# Patient Record
Sex: FEMALE | Race: WHITE | NOT HISPANIC OR LATINO | Employment: UNEMPLOYED | URBAN - METROPOLITAN AREA
[De-identification: names, ages, dates, MRNs, and addresses within clinical notes are randomized per-mention and may not be internally consistent; named-entity substitution may affect disease eponyms.]

---

## 2023-03-20 PROBLEM — M25.511 RIGHT SHOULDER PAIN: Status: ACTIVE | Noted: 2023-03-20

## 2023-03-20 PROBLEM — M81.0 AGE RELATED OSTEOPOROSIS: Status: ACTIVE | Noted: 2023-03-20

## 2023-03-20 PROBLEM — M25.611 STIFFNESS OF RIGHT SHOULDER, NOT ELSEWHERE CLASSIFIED: Status: ACTIVE | Noted: 2023-03-20

## 2023-03-20 PROBLEM — R29.3 POSTURE IMBALANCE: Status: ACTIVE | Noted: 2023-03-20

## 2023-03-20 RX ORDER — NAPROXEN 500 MG/1
1 TABLET ORAL
COMMUNITY
Start: 2022-10-31 | End: 2023-03-22

## 2023-03-22 ENCOUNTER — OFFICE VISIT (OUTPATIENT)
Dept: PRIMARY CARE | Facility: CLINIC | Age: 58
End: 2023-03-22
Payer: COMMERCIAL

## 2023-03-22 VITALS
WEIGHT: 128.6 LBS | DIASTOLIC BLOOD PRESSURE: 76 MMHG | SYSTOLIC BLOOD PRESSURE: 106 MMHG | HEART RATE: 80 BPM | BODY MASS INDEX: 20.67 KG/M2 | OXYGEN SATURATION: 99 % | RESPIRATION RATE: 18 BRPM | HEIGHT: 66 IN

## 2023-03-22 DIAGNOSIS — R10.10 PAIN OF UPPER ABDOMEN: Primary | ICD-10-CM

## 2023-03-22 PROCEDURE — 1036F TOBACCO NON-USER: CPT | Performed by: NURSE PRACTITIONER

## 2023-03-22 PROCEDURE — 99213 OFFICE O/P EST LOW 20 MIN: CPT | Performed by: NURSE PRACTITIONER

## 2023-03-22 RX ORDER — CALCIUM CARBONATE 500(1250)
1 TABLET ORAL DAILY
COMMUNITY
Start: 2010-07-26

## 2023-03-22 RX ORDER — MULTIVITAMIN
1 TABLET ORAL DAILY
COMMUNITY

## 2023-03-22 ASSESSMENT — ENCOUNTER SYMPTOMS
NAUSEA: 1
BLOATING: 0
CONSTIPATION: 0
VOMITING: 0
CHILLS: 0
ANOREXIA: 0
ABDOMINAL PAIN: 1

## 2023-03-22 NOTE — ASSESSMENT & PLAN NOTE
Negative exam today  Will try famotidine if happens again  Red flags discussed   Discussed will do further workup if it returns

## 2023-03-22 NOTE — PROGRESS NOTES
"Subjective   Patient ID: Dejah Ambrose is a 57 y.o. female who presents for GI Problem (Patient in today to discuss concerns of right sided abdomen and LBP for the past few months. [id number]).    Bms are regular - daily  Has not had symptoms since 3/17  Describes as aching and did have nausea - did resolve when episode stopped - lasted about an hour    Abdominal Pain  This is a new problem. The current episode started more than 1 month ago. The onset quality is undetermined. The problem occurs intermittently. The problem has been unchanged. The pain is located in the epigastric region. The pain is at a severity of 6/10. The pain is mild. Quality: aching discomfort. The abdominal pain radiates to the periumbilical region. Associated symptoms include nausea. Pertinent negatives include no anorexia, constipation or vomiting. Associated symptoms comments: Negative constipation or diarrhea  . Nothing aggravates the pain. There is no history of PUD.        Review of Systems   Constitutional:  Negative for chills.   Gastrointestinal:  Positive for abdominal pain and nausea. Negative for anorexia, bloating, constipation, dysphagia and vomiting.   All other systems reviewed and are negative.      Objective   /76   Pulse 80   Resp 18   Ht 1.676 m (5' 6\")   Wt 58.3 kg (128 lb 9.6 oz)   SpO2 99%   BMI 20.76 kg/m²     Physical Exam  Vitals and nursing note reviewed.   Constitutional:       Appearance: Normal appearance. She is normal weight.   HENT:      Head: Normocephalic and atraumatic.      Mouth/Throat:      Mouth: Mucous membranes are moist.   Eyes:      Extraocular Movements: Extraocular movements intact.      Conjunctiva/sclera: Conjunctivae normal.      Pupils: Pupils are equal, round, and reactive to light.   Cardiovascular:      Rate and Rhythm: Normal rate and regular rhythm.      Pulses: Normal pulses.      Heart sounds: Normal heart sounds.   Pulmonary:      Effort: Pulmonary effort is normal.      Breath " sounds: Normal breath sounds.   Abdominal:      General: Abdomen is flat. Bowel sounds are normal. There is no distension.      Palpations: Abdomen is soft. There is no mass.      Tenderness: There is no abdominal tenderness. There is no guarding or rebound.      Hernia: No hernia is present.   Skin:     General: Skin is warm.      Capillary Refill: Capillary refill takes less than 2 seconds.   Neurological:      General: No focal deficit present.      Mental Status: She is alert and oriented to person, place, and time.   Psychiatric:         Mood and Affect: Mood normal.         Behavior: Behavior normal.         Thought Content: Thought content normal.         Judgment: Judgment normal.         Assessment/Plan   Problem List Items Addressed This Visit          Nervous    Pain of upper abdomen - Primary     Negative exam today  Will try famotidine if happens again  Red flags discussed   Discussed will do further workup if it returns

## 2023-03-22 NOTE — PATIENT INSTRUCTIONS
If you have the symptoms again - try over the counter famotidine  If the symptoms become more frequent - please let me know and we will do a further workup

## 2023-03-30 DIAGNOSIS — R10.10 PAIN OF UPPER ABDOMEN: Primary | ICD-10-CM

## 2023-03-30 NOTE — PROGRESS NOTES
Left side abdominal pain and umbilical pain returning.  Requesting further work up.  Famotidine did not help.  Cbc,cmp, CT abd ordered

## 2023-03-31 ENCOUNTER — LAB (OUTPATIENT)
Dept: LAB | Facility: LAB | Age: 58
End: 2023-03-31
Payer: COMMERCIAL

## 2023-03-31 DIAGNOSIS — R10.10 PAIN OF UPPER ABDOMEN: ICD-10-CM

## 2023-03-31 LAB
ALANINE AMINOTRANSFERASE (SGPT) (U/L) IN SER/PLAS: 20 U/L (ref 7–45)
ALBUMIN (G/DL) IN SER/PLAS: 4.4 G/DL (ref 3.4–5)
ALKALINE PHOSPHATASE (U/L) IN SER/PLAS: 74 U/L (ref 33–110)
ANION GAP IN SER/PLAS: 14 MMOL/L (ref 10–20)
ASPARTATE AMINOTRANSFERASE (SGOT) (U/L) IN SER/PLAS: 22 U/L (ref 9–39)
BILIRUBIN TOTAL (MG/DL) IN SER/PLAS: 0.8 MG/DL (ref 0–1.2)
CALCIUM (MG/DL) IN SER/PLAS: 9.3 MG/DL (ref 8.6–10.3)
CARBON DIOXIDE, TOTAL (MMOL/L) IN SER/PLAS: 29 MMOL/L (ref 21–32)
CHLORIDE (MMOL/L) IN SER/PLAS: 101 MMOL/L (ref 98–107)
CREATININE (MG/DL) IN SER/PLAS: 0.71 MG/DL (ref 0.5–1.05)
ERYTHROCYTE DISTRIBUTION WIDTH (RATIO) BY AUTOMATED COUNT: 12.6 % (ref 11.5–14.5)
ERYTHROCYTE MEAN CORPUSCULAR HEMOGLOBIN CONCENTRATION (G/DL) BY AUTOMATED: 34.1 G/DL (ref 32–36)
ERYTHROCYTE MEAN CORPUSCULAR VOLUME (FL) BY AUTOMATED COUNT: 95 FL (ref 80–100)
ERYTHROCYTES (10*6/UL) IN BLOOD BY AUTOMATED COUNT: 4.36 X10E12/L (ref 4–5.2)
GFR FEMALE: >90 ML/MIN/1.73M2
GLUCOSE (MG/DL) IN SER/PLAS: 95 MG/DL (ref 74–99)
HEMATOCRIT (%) IN BLOOD BY AUTOMATED COUNT: 41.4 % (ref 36–46)
HEMOGLOBIN (G/DL) IN BLOOD: 14.1 G/DL (ref 12–16)
LEUKOCYTES (10*3/UL) IN BLOOD BY AUTOMATED COUNT: 4.4 X10E9/L (ref 4.4–11.3)
PLATELETS (10*3/UL) IN BLOOD AUTOMATED COUNT: 226 X10E9/L (ref 150–450)
POTASSIUM (MMOL/L) IN SER/PLAS: 4.6 MMOL/L (ref 3.5–5.3)
PROTEIN TOTAL: 6.8 G/DL (ref 6.4–8.2)
SODIUM (MMOL/L) IN SER/PLAS: 139 MMOL/L (ref 136–145)
UREA NITROGEN (MG/DL) IN SER/PLAS: 13 MG/DL (ref 6–23)

## 2023-03-31 PROCEDURE — 36415 COLL VENOUS BLD VENIPUNCTURE: CPT

## 2023-03-31 PROCEDURE — 80053 COMPREHEN METABOLIC PANEL: CPT

## 2023-03-31 PROCEDURE — 85027 COMPLETE CBC AUTOMATED: CPT

## 2023-12-08 ENCOUNTER — ALLIED HEALTH (OUTPATIENT)
Dept: INTEGRATIVE MEDICINE | Facility: CLINIC | Age: 58
End: 2023-12-08

## 2023-12-08 DIAGNOSIS — M54.31 RIGHT SIDED SCIATICA: Primary | ICD-10-CM

## 2023-12-08 PROCEDURE — 97810 ACUP 1/> WO ESTIM 1ST 15 MIN: CPT | Performed by: NATUROPATH

## 2023-12-08 PROCEDURE — 97811 ACUP 1/> W/O ESTIM EA ADD 15: CPT | Performed by: NATUROPATH

## 2023-12-08 NOTE — PROGRESS NOTES
Acupuncture Visit:     Subjective   Patient ID: Dejah Ambrose is a 58 y.o. female who presents for No chief complaint on file.  Had covid in October  Bad cough that lingered and caused back and right side sciatic pain  Has finally largely cleared  Pain depends on how  she moves or sits only right sided  was shooting down anterior lege to knee.   No longer having shooting pain    No pain on left side    Pain 1/10 today, can be up to 5/10    No lingering respiratory sx.     Still watching hair growth    Sleep- better recnely  Energy- still building, but getting better                  Review of Systems         Provider reviewed plan for the acupuncture session, precautions and contraindications. Patient/guardian/hospital staff has given consent to treat with full understanding of what to expect during the session. Before acupuncture began, provider explained to the patient to communicate at any time if the procedure was causing discomfort past their tolerance level. Patient agreed to advise acupuncturist. The acupuncturist counseled the patient on the risks of acupuncture treatment including pain, infection, bleeding, and no relief of pain. The patient was positioned comfortably. There was no evidence of infection at the site of needle insertions.    Objective   Physical Exam                             Assessment/Plan

## 2023-12-19 ENCOUNTER — APPOINTMENT (OUTPATIENT)
Dept: INTEGRATIVE MEDICINE | Facility: CLINIC | Age: 58
End: 2023-12-19
Payer: COMMERCIAL

## 2024-01-05 ENCOUNTER — APPOINTMENT (OUTPATIENT)
Dept: INTEGRATIVE MEDICINE | Facility: CLINIC | Age: 59
End: 2024-01-05
Payer: COMMERCIAL

## 2024-02-01 ENCOUNTER — OFFICE VISIT (OUTPATIENT)
Dept: PRIMARY CARE | Facility: CLINIC | Age: 59
End: 2024-02-01
Payer: COMMERCIAL

## 2024-02-01 VITALS
HEART RATE: 74 BPM | SYSTOLIC BLOOD PRESSURE: 128 MMHG | DIASTOLIC BLOOD PRESSURE: 75 MMHG | WEIGHT: 122 LBS | BODY MASS INDEX: 19.61 KG/M2 | HEIGHT: 66 IN | OXYGEN SATURATION: 96 %

## 2024-02-01 DIAGNOSIS — Z00.00 WELLNESS EXAMINATION: Primary | ICD-10-CM

## 2024-02-01 DIAGNOSIS — E55.9 VITAMIN D DEFICIENCY: ICD-10-CM

## 2024-02-01 DIAGNOSIS — K21.00 GASTROESOPHAGEAL REFLUX DISEASE WITH ESOPHAGITIS WITHOUT HEMORRHAGE: ICD-10-CM

## 2024-02-01 PROCEDURE — 1036F TOBACCO NON-USER: CPT

## 2024-02-01 PROCEDURE — 99214 OFFICE O/P EST MOD 30 MIN: CPT

## 2024-02-01 RX ORDER — LANSOPRAZOLE 30 MG/1
30 CAPSULE, DELAYED RELEASE ORAL DAILY
Qty: 30 CAPSULE | Refills: 1 | Status: SHIPPED | OUTPATIENT
Start: 2024-02-01 | End: 2024-03-12 | Stop reason: SDUPTHER

## 2024-02-01 ASSESSMENT — ENCOUNTER SYMPTOMS
HOARSE VOICE: 0
FACIAL ASYMMETRY: 0
ARTHRALGIAS: 0
FATIGUE: 0
GLOBUS SENSATION: 0
WATER BRASH: 0
ABDOMINAL PAIN: 1
SINUS PRESSURE: 0
SEIZURES: 0
WHEEZING: 0
ORTHOPNEA: 0
SORE THROAT: 0
TROUBLE SWALLOWING: 0
WEAKNESS: 0
BELCHING: 0
SHORTNESS OF BREATH: 0
JOINT SWELLING: 0
FEVER: 0
COUGH: 0
PALPITATIONS: 0
RHINORRHEA: 0
NAUSEA: 0
CONSTIPATION: 0
UNEXPECTED WEIGHT CHANGE: 0
HEMATURIA: 0
WEIGHT LOSS: 0
HEARTBURN: 1
WOUND: 0
EARLY SATIETY: 0
BACK PAIN: 0
CHOKING: 0
STRIDOR: 0
SINUS PAIN: 0
DYSURIA: 0
PSYCHIATRIC NEGATIVE: 1
LIGHT-HEADEDNESS: 0

## 2024-02-01 ASSESSMENT — PATIENT HEALTH QUESTIONNAIRE - PHQ9
2. FEELING DOWN, DEPRESSED OR HOPELESS: NOT AT ALL
SUM OF ALL RESPONSES TO PHQ9 QUESTIONS 1 AND 2: 0
1. LITTLE INTEREST OR PLEASURE IN DOING THINGS: NOT AT ALL

## 2024-02-01 NOTE — PATIENT INSTRUCTIONS
Please get fasting labs done in the next few DAYS (nothing to eat or drink for 10 hours prior to blood draw EXCEPT black coffee and water), we will call you with the results. If you do not hear from up 2-3 business days after you had your labs drawn, please call the office at [phone]    Vaccines are important!  Yearly seasonal flu shots are recommended, high dose is indicated for patient over 65.   - Tetanus boosters should be given every 10 yrs, this also covers for diphtheria and pertussis.   - most insurances cover the 2-shot series for shingles (shingrix) after the age of 60.   - Pneumonia vaccines are given routinely at age 65, however is you have a chronic heart or lung diagnosis this may be given earlier.     Preventative cancer screens SAVE LIVES!  - Prostate cancer screening is included in yearly blood work in men over 40.   - Colon cancer screening is recommended at age  for all patients, sometimes sooner based on family history.   - other tests may be recommended if you are a smoker!     150 mins of aerobic exercise is advised for good cardiovascular health and maintain a healthy weight.     Please try to work on maintaining a healthy body weight, with a BMI close to or at a BMI 19-25.    Recommend a whole-foods, plant-based diet, filled with fresh fruits, vegetables, seeds, beans, nuts and berries.    Discussed smoking cessation/Abstinence is best.      Abstaining from the use of alcohol is best. However if you choose to drink current guidelines are 2 or less drinks per day for men and 1.5 or less per day for women (and not all saved for one night on the weekend).    Your blood pressure should be BELOW 135/85.  If your readings were high today, please consider an at home blood pressure monitor to keep a log to bring with you to your next appointment.     OF course, do not text and drive and always wear a seat belt.

## 2024-02-01 NOTE — PROGRESS NOTES
Subjective   Patient ID: Dejah Ambrose is a 58 y.o. female who presents for Follow-up (Starts in chest burning sensation down to upper abdomin causing discomfort X 3 days ).    GERD  She complains of abdominal pain and heartburn. She reports no belching, no chest pain, no choking, no coughing, no dysphagia, no early satiety, no globus sensation, no hoarse voice, no nausea, no sore throat, no stridor, no tooth decay, no water brash or no wheezing. This is a new problem. The current episode started in the past 7 days. The problem occurs occasionally. The heartburn duration is an hour (maybe 2). The heartburn is located in the substernum. The heartburn is of moderate intensity. The heartburn does not wake her from sleep. The heartburn does not limit her activity. The heartburn doesn't change with position. Nothing aggravates the symptoms. Pertinent negatives include no anemia, fatigue, melena, orthopnea or weight loss. There are no known risk factors. She has tried nothing for the symptoms.        Review of Systems   Constitutional:  Negative for fatigue, fever, unexpected weight change and weight loss.   HENT:  Negative for congestion, ear discharge, ear pain, hoarse voice, nosebleeds, postnasal drip, rhinorrhea, sinus pressure, sinus pain, sneezing, sore throat and trouble swallowing.    Respiratory:  Negative for cough, choking, shortness of breath and wheezing.    Cardiovascular:  Negative for chest pain, palpitations and leg swelling.   Gastrointestinal:  Positive for abdominal pain and heartburn. Negative for constipation, dysphagia, melena and nausea.   Genitourinary:  Negative for dysuria, hematuria, menstrual problem, pelvic pain, vaginal bleeding and vaginal pain.   Musculoskeletal:  Negative for arthralgias, back pain, gait problem and joint swelling.   Skin:  Negative for rash and wound.   Neurological:  Negative for seizures, facial asymmetry, weakness and light-headedness.   Psychiatric/Behavioral:  "Negative.         Objective   /75   Pulse 74   Ht 1.676 m (5' 6\")   Wt 55.3 kg (122 lb)   SpO2 96%   BMI 19.69 kg/m²     Physical Exam  Vitals reviewed.   Constitutional:       General: She is not in acute distress.     Appearance: Normal appearance. She is normal weight. She is not ill-appearing.   Pulmonary:      Effort: Pulmonary effort is normal.      Breath sounds: Normal breath sounds.   Abdominal:      General: Abdomen is flat. Bowel sounds are normal. There is no distension.      Palpations: Abdomen is soft. There is no mass.      Tenderness: There is no abdominal tenderness. There is no guarding or rebound.      Hernia: No hernia is present.   Neurological:      Mental Status: She is alert.         Assessment/Plan   Problem List Items Addressed This Visit    None  Visit Diagnoses         Codes    Wellness examination    -  Primary Z00.00    Relevant Orders    Comprehensive Metabolic Panel    CBC    TSH with reflex to Free T4 if abnormal    Lipid Panel    Vitamin D deficiency     E55.9    Relevant Orders    Vitamin D 25-Hydroxy,Total (for eval of Vitamin D levels)    Gastroesophageal reflux disease with esophagitis without hemorrhage     K21.00    Relevant Medications    lansoprazole (Prevacid) 30 mg DR capsule               "

## 2024-02-02 ENCOUNTER — LAB (OUTPATIENT)
Dept: LAB | Facility: LAB | Age: 59
End: 2024-02-02
Payer: COMMERCIAL

## 2024-02-02 DIAGNOSIS — E55.9 VITAMIN D DEFICIENCY: ICD-10-CM

## 2024-02-02 DIAGNOSIS — Z00.00 WELLNESS EXAMINATION: ICD-10-CM

## 2024-02-02 LAB
25(OH)D3 SERPL-MCNC: 40 NG/ML (ref 31–100)
ALBUMIN SERPL-MCNC: 4.7 G/DL (ref 3.5–5)
ALP BLD-CCNC: 94 U/L (ref 35–125)
ALT SERPL-CCNC: 26 U/L (ref 5–40)
ANION GAP SERPL CALC-SCNC: 14 MMOL/L
AST SERPL-CCNC: 32 U/L (ref 5–40)
BILIRUB SERPL-MCNC: 0.5 MG/DL (ref 0.1–1.2)
BUN SERPL-MCNC: 17 MG/DL (ref 8–25)
CALCIUM SERPL-MCNC: 9.6 MG/DL (ref 8.5–10.4)
CHLORIDE SERPL-SCNC: 103 MMOL/L (ref 97–107)
CHOLEST SERPL-MCNC: 177 MG/DL (ref 133–200)
CHOLEST/HDLC SERPL: 2 {RATIO}
CO2 SERPL-SCNC: 25 MMOL/L (ref 24–31)
CREAT SERPL-MCNC: 0.7 MG/DL (ref 0.4–1.6)
EGFRCR SERPLBLD CKD-EPI 2021: >90 ML/MIN/1.73M*2
ERYTHROCYTE [DISTWIDTH] IN BLOOD BY AUTOMATED COUNT: 12.8 % (ref 11.5–14.5)
GLUCOSE SERPL-MCNC: 97 MG/DL (ref 65–99)
HCT VFR BLD AUTO: 42 % (ref 36–46)
HDLC SERPL-MCNC: 88 MG/DL
HGB BLD-MCNC: 13.7 G/DL (ref 12–16)
LDLC SERPL CALC-MCNC: 74 MG/DL (ref 65–130)
MCH RBC QN AUTO: 31.9 PG (ref 26–34)
MCHC RBC AUTO-ENTMCNC: 32.6 G/DL (ref 32–36)
MCV RBC AUTO: 98 FL (ref 80–100)
NRBC BLD-RTO: 0 /100 WBCS (ref 0–0)
PLATELET # BLD AUTO: 225 X10*3/UL (ref 150–450)
POTASSIUM SERPL-SCNC: 4.6 MMOL/L (ref 3.4–5.1)
PROT SERPL-MCNC: 6.8 G/DL (ref 5.9–7.9)
RBC # BLD AUTO: 4.3 X10*6/UL (ref 4–5.2)
SODIUM SERPL-SCNC: 142 MMOL/L (ref 133–145)
TRIGL SERPL-MCNC: 76 MG/DL (ref 40–150)
TSH SERPL DL<=0.05 MIU/L-ACNC: 2.09 MIU/L (ref 0.27–4.2)
WBC # BLD AUTO: 4.1 X10*3/UL (ref 4.4–11.3)

## 2024-02-02 PROCEDURE — 84443 ASSAY THYROID STIM HORMONE: CPT

## 2024-02-02 PROCEDURE — 80053 COMPREHEN METABOLIC PANEL: CPT

## 2024-02-02 PROCEDURE — 80061 LIPID PANEL: CPT

## 2024-02-02 PROCEDURE — 85027 COMPLETE CBC AUTOMATED: CPT

## 2024-02-02 PROCEDURE — 36415 COLL VENOUS BLD VENIPUNCTURE: CPT

## 2024-02-02 PROCEDURE — 82306 VITAMIN D 25 HYDROXY: CPT

## 2024-03-12 ENCOUNTER — OFFICE VISIT (OUTPATIENT)
Dept: PRIMARY CARE | Facility: CLINIC | Age: 59
End: 2024-03-12
Payer: COMMERCIAL

## 2024-03-12 VITALS
DIASTOLIC BLOOD PRESSURE: 75 MMHG | BODY MASS INDEX: 19.61 KG/M2 | HEART RATE: 55 BPM | SYSTOLIC BLOOD PRESSURE: 128 MMHG | HEIGHT: 66 IN | WEIGHT: 122 LBS | OXYGEN SATURATION: 95 %

## 2024-03-12 DIAGNOSIS — K21.00 GASTROESOPHAGEAL REFLUX DISEASE WITH ESOPHAGITIS WITHOUT HEMORRHAGE: ICD-10-CM

## 2024-03-12 PROCEDURE — 99213 OFFICE O/P EST LOW 20 MIN: CPT

## 2024-03-12 PROCEDURE — 1036F TOBACCO NON-USER: CPT

## 2024-03-12 RX ORDER — LANSOPRAZOLE 30 MG/1
30 CAPSULE, DELAYED RELEASE ORAL DAILY
Qty: 30 CAPSULE | Refills: 1 | Status: SHIPPED | OUTPATIENT
Start: 2024-03-12 | End: 2024-05-11

## 2024-03-12 ASSESSMENT — ENCOUNTER SYMPTOMS
NAUSEA: 1
UNEXPECTED WEIGHT CHANGE: 0
DIARRHEA: 0
ABDOMINAL PAIN: 0
FATIGUE: 1
APPETITE CHANGE: 1
CONSTIPATION: 0
RECTAL PAIN: 0
FEVER: 0
VOMITING: 0

## 2024-03-12 NOTE — PROGRESS NOTES
"Subjective   Patient ID: Dejah Ambrose is a 58 y.o. female who presents for Follow-up (seen 2/1 for stomach issues, same symptoms as before ).    GERD  She complains of abdominal pain and heartburn. She reports no belching, no chest pain, no choking, no coughing, no dysphagia, no early satiety, no globus sensation, no hoarse voice, no nausea, no sore throat, no stridor, no tooth decay, no water brash or no wheezing. This is a new problem. The current episode started in the past 7 days. The problem occurs occasionally. The heartburn duration is an hour (maybe 2). The heartburn is located in the substernum. The heartburn is of moderate intensity. The heartburn does not wake her from sleep. The heartburn does not limit her activity. The heartburn doesn't change with position. Nothing aggravates the symptoms. Pertinent negatives include no anemia, fatigue, melena, orthopnea or weight loss. There are no known risk factors. She has tried nothing for the symptoms.            Review of Systems   Constitutional:  Positive for appetite change and fatigue. Negative for fever and unexpected weight change.   Gastrointestinal:  Positive for nausea. Negative for abdominal pain, constipation, diarrhea, rectal pain and vomiting.       Objective   /75   Pulse 55   Ht 1.676 m (5' 6\")   Wt 55.3 kg (122 lb)   SpO2 95%   BMI 19.69 kg/m²     Physical Exam  Vitals and nursing note reviewed.   Constitutional:       Appearance: Normal appearance. She is normal weight.   Cardiovascular:      Pulses: Normal pulses.      Heart sounds: Normal heart sounds.   Pulmonary:      Effort: Pulmonary effort is normal.      Breath sounds: Normal breath sounds.   Neurological:      Mental Status: She is alert.         Assessment/Plan   Problem List Items Addressed This Visit    None  Visit Diagnoses         Codes    Gastroesophageal reflux disease with esophagitis without hemorrhage     K21.00    Relevant Medications    lansoprazole " (Prevacid) 30 mg DR capsule

## 2024-03-22 PROBLEM — I49.3 PVC'S (PREMATURE VENTRICULAR CONTRACTIONS): Status: ACTIVE | Noted: 2024-03-22

## 2024-03-22 PROBLEM — I34.1 MVP (MITRAL VALVE PROLAPSE): Status: ACTIVE | Noted: 2024-03-22

## 2024-03-25 ENCOUNTER — OFFICE VISIT (OUTPATIENT)
Dept: CARDIOLOGY | Facility: CLINIC | Age: 59
End: 2024-03-25
Payer: COMMERCIAL

## 2024-03-25 VITALS
BODY MASS INDEX: 19.53 KG/M2 | DIASTOLIC BLOOD PRESSURE: 70 MMHG | SYSTOLIC BLOOD PRESSURE: 100 MMHG | HEART RATE: 70 BPM | WEIGHT: 121 LBS

## 2024-03-25 DIAGNOSIS — I34.1 MVP (MITRAL VALVE PROLAPSE): ICD-10-CM

## 2024-03-25 DIAGNOSIS — I49.3 PVC'S (PREMATURE VENTRICULAR CONTRACTIONS): Primary | ICD-10-CM

## 2024-03-25 PROCEDURE — 99213 OFFICE O/P EST LOW 20 MIN: CPT | Performed by: INTERNAL MEDICINE

## 2024-03-25 PROCEDURE — 1036F TOBACCO NON-USER: CPT | Performed by: INTERNAL MEDICINE

## 2024-03-25 ASSESSMENT — ENCOUNTER SYMPTOMS
DYSURIA: 0
DYSPNEA ON EXERTION: 0
SHORTNESS OF BREATH: 0
COUGH: 0
BLURRED VISION: 0
LOSS OF SENSATION IN FEET: 0
OCCASIONAL FEELINGS OF UNSTEADINESS: 0
PALPITATIONS: 1
DEPRESSION: 0
HEMATURIA: 0
ABDOMINAL PAIN: 0
NUMBNESS: 0
PARESTHESIAS: 0

## 2024-03-25 ASSESSMENT — PAIN SCALES - GENERAL: PAINLEVEL: 0-NO PAIN

## 2024-03-25 ASSESSMENT — PATIENT HEALTH QUESTIONNAIRE - PHQ9
1. LITTLE INTEREST OR PLEASURE IN DOING THINGS: NOT AT ALL
2. FEELING DOWN, DEPRESSED OR HOPELESS: NOT AT ALL
SUM OF ALL RESPONSES TO PHQ9 QUESTIONS 1 & 2: 0

## 2024-03-25 NOTE — ASSESSMENT & PLAN NOTE
We have felt the patient has symptomatic PVCs.  This evening, more frequent when she has more stressful situations at home and last when things are more calm.  We discussed adding a medication to try to suppress the PVCs and palpitations that she would like to the chest wall along for the time being.

## 2024-03-25 NOTE — ASSESSMENT & PLAN NOTE
Patient with no unusual shortness of breath and exercise on a regular basis with no significant restrictions or limitations.  Last echocardiogram revealed a trace to mild degree of mitral valve regurgitation.  Will repeat echocardiogram on return visit and reassess mitral valve.

## 2024-03-25 NOTE — PROGRESS NOTES
Subjective   Dejah Ambrose is a 58 y.o. female.    Chief Complaint:  Follow-up (Yearly follow up)    HPI  Overall doing relatively well.  Does have palpitations at times of stressful situations at home.  She thinks regular physical activity helps reduce her stress and also reduce the palpitations as well.  Probably stress levels are hide palpitations are more frequent.    Review of Systems   Constitutional: Negative for malaise/fatigue.   HENT:  Negative for congestion.    Eyes:  Negative for blurred vision.   Cardiovascular:  Positive for palpitations. Negative for chest pain and dyspnea on exertion.   Respiratory:  Negative for cough and shortness of breath.    Musculoskeletal:  Negative for joint pain.   Gastrointestinal:  Negative for abdominal pain.   Genitourinary:  Negative for dysuria and hematuria.   Neurological:  Negative for numbness and paresthesias.       Objective   Constitutional:       Appearance: Not in distress.   Eyes:      Conjunctiva/sclera: Conjunctivae normal.   Neck:      Vascular: JVD normal.   Pulmonary:      Breath sounds: Normal breath sounds. No wheezing. No rhonchi. No rales.   Cardiovascular:      Normal rate. Regular rhythm.      Murmurs: There is no murmur.      No gallop.  No click. No rub.   Abdominal:      Palpations: Abdomen is soft.   Neurological:      General: No focal deficit present.      Mental Status: Alert.         Lab Review:       Assessment/Plan   The encounter diagnosis was PVC's (premature ventricular contractions).    MVP (mitral valve prolapse)  Patient with no unusual shortness of breath and exercise on a regular basis with no significant restrictions or limitations.  Last echocardiogram revealed a trace to mild degree of mitral valve regurgitation.  Will repeat echocardiogram on return visit and reassess mitral valve.    PVC's (premature ventricular contractions)  We have felt the patient has symptomatic PVCs.  This evening, more frequent when she has more  stressful situations at home and last when things are more calm.  We discussed adding a medication to try to suppress the PVCs and palpitations that she would like to the chest wall along for the time being.

## 2024-05-16 PROCEDURE — 88175 CYTOPATH C/V AUTO FLUID REDO: CPT

## 2024-05-20 ENCOUNTER — LAB REQUISITION (OUTPATIENT)
Dept: LAB | Facility: HOSPITAL | Age: 59
End: 2024-05-20
Payer: COMMERCIAL

## 2024-05-20 DIAGNOSIS — Z01.419 ENCOUNTER FOR GYNECOLOGICAL EXAMINATION (GENERAL) (ROUTINE) WITHOUT ABNORMAL FINDINGS: ICD-10-CM

## 2024-05-20 DIAGNOSIS — Z11.51 ENCOUNTER FOR SCREENING FOR HUMAN PAPILLOMAVIRUS (HPV): ICD-10-CM

## 2024-05-20 DIAGNOSIS — Z12.4 ENCOUNTER FOR SCREENING FOR MALIGNANT NEOPLASM OF CERVIX: ICD-10-CM

## 2024-05-28 ENCOUNTER — HOSPITAL ENCOUNTER (OUTPATIENT)
Dept: RADIOLOGY | Facility: HOSPITAL | Age: 59
Discharge: HOME | End: 2024-05-28
Payer: COMMERCIAL

## 2024-05-28 VITALS — HEIGHT: 66 IN | WEIGHT: 120 LBS | BODY MASS INDEX: 19.29 KG/M2

## 2024-05-28 DIAGNOSIS — M81.0 AGE-RELATED OSTEOPOROSIS WITHOUT CURRENT PATHOLOGICAL FRACTURE: ICD-10-CM

## 2024-05-28 DIAGNOSIS — Z12.31 ENCOUNTER FOR SCREENING MAMMOGRAM FOR MALIGNANT NEOPLASM OF BREAST: ICD-10-CM

## 2024-05-28 PROCEDURE — 77067 SCR MAMMO BI INCL CAD: CPT | Performed by: RADIOLOGY

## 2024-05-28 PROCEDURE — 77063 BREAST TOMOSYNTHESIS BI: CPT | Performed by: RADIOLOGY

## 2024-05-28 PROCEDURE — 77080 DXA BONE DENSITY AXIAL: CPT | Performed by: RADIOLOGY

## 2024-05-28 PROCEDURE — 77080 DXA BONE DENSITY AXIAL: CPT

## 2024-05-28 PROCEDURE — 77067 SCR MAMMO BI INCL CAD: CPT

## 2024-06-11 LAB
CYTOLOGY CMNT CVX/VAG CYTO-IMP: NORMAL
LAB AP HPV GENOTYPE QUESTION: YES
LAB AP HPV HR: NORMAL
LABORATORY COMMENT REPORT: NORMAL
MENSTRUAL HX REPORTED: NORMAL
PATH REPORT.TOTAL CANCER: NORMAL

## 2024-06-27 ENCOUNTER — APPOINTMENT (OUTPATIENT)
Dept: OTOLARYNGOLOGY | Facility: CLINIC | Age: 59
End: 2024-06-27
Payer: COMMERCIAL

## 2024-06-27 VITALS — HEIGHT: 66 IN | BODY MASS INDEX: 19.44 KG/M2 | WEIGHT: 121 LBS

## 2024-06-27 DIAGNOSIS — K11.20 SIALADENITIS: Primary | ICD-10-CM

## 2024-06-27 DIAGNOSIS — K11.5 SIALOLITHIASIS: ICD-10-CM

## 2024-06-27 DIAGNOSIS — J35.8 TONSIL STONE: ICD-10-CM

## 2024-06-27 PROCEDURE — 1036F TOBACCO NON-USER: CPT | Performed by: OTOLARYNGOLOGY

## 2024-06-27 PROCEDURE — 99203 OFFICE O/P NEW LOW 30 MIN: CPT | Performed by: OTOLARYNGOLOGY

## 2024-12-05 ENCOUNTER — LAB REQUISITION (OUTPATIENT)
Dept: LAB | Facility: HOSPITAL | Age: 59
End: 2024-12-05
Payer: COMMERCIAL

## 2024-12-05 DIAGNOSIS — N89.8 OTHER SPECIFIED NONINFLAMMATORY DISORDERS OF VAGINA: ICD-10-CM

## 2024-12-05 PROCEDURE — 87205 SMEAR GRAM STAIN: CPT

## 2024-12-06 LAB
BACTERIAL VAGINOSIS VAG-IMP: NORMAL
CLUE CELLS VAG LPF-#/AREA: NORMAL /[LPF]
NUGENT SCORE: 4
YEAST VAG WET PREP-#/AREA: NORMAL

## 2025-02-19 ENCOUNTER — APPOINTMENT (OUTPATIENT)
Dept: PRIMARY CARE | Facility: CLINIC | Age: 60
End: 2025-02-19
Payer: COMMERCIAL

## 2025-02-19 VITALS
WEIGHT: 124 LBS | SYSTOLIC BLOOD PRESSURE: 126 MMHG | BODY MASS INDEX: 19.93 KG/M2 | OXYGEN SATURATION: 99 % | HEIGHT: 66 IN | DIASTOLIC BLOOD PRESSURE: 73 MMHG | HEART RATE: 71 BPM

## 2025-02-19 DIAGNOSIS — E55.9 VITAMIN D DEFICIENCY: ICD-10-CM

## 2025-02-19 DIAGNOSIS — Z13.29 SCREENING FOR THYROID DISORDER: ICD-10-CM

## 2025-02-19 DIAGNOSIS — Z13.0 SCREENING FOR DEFICIENCY ANEMIA: ICD-10-CM

## 2025-02-19 DIAGNOSIS — Z13.1 DIABETES MELLITUS SCREENING: ICD-10-CM

## 2025-02-19 DIAGNOSIS — Z13.220 LIPID SCREENING: ICD-10-CM

## 2025-02-19 DIAGNOSIS — Z13.220 SCREENING FOR HYPERCHOLESTEROLEMIA: ICD-10-CM

## 2025-02-19 DIAGNOSIS — Z00.00 WELLNESS EXAMINATION: Primary | ICD-10-CM

## 2025-02-19 PROCEDURE — 99396 PREV VISIT EST AGE 40-64: CPT

## 2025-02-19 PROCEDURE — 1036F TOBACCO NON-USER: CPT

## 2025-02-19 PROCEDURE — 3008F BODY MASS INDEX DOCD: CPT

## 2025-02-19 ASSESSMENT — ENCOUNTER SYMPTOMS
SHORTNESS OF BREATH: 0
FACIAL ASYMMETRY: 0
PSYCHIATRIC NEGATIVE: 1
LIGHT-HEADEDNESS: 0
ABDOMINAL PAIN: 0
SORE THROAT: 0
COUGH: 0
PALPITATIONS: 1
HEMATURIA: 0
RHINORRHEA: 0
JOINT SWELLING: 0
WOUND: 0
SEIZURES: 0
UNEXPECTED WEIGHT CHANGE: 0
BACK PAIN: 0
TROUBLE SWALLOWING: 0
SINUS PAIN: 0
NAUSEA: 0
DYSURIA: 0
FATIGUE: 0
WHEEZING: 0
WEAKNESS: 0
ARTHRALGIAS: 0
SINUS PRESSURE: 0
FEVER: 0
CONSTIPATION: 0

## 2025-02-19 ASSESSMENT — PATIENT HEALTH QUESTIONNAIRE - PHQ9
SUM OF ALL RESPONSES TO PHQ9 QUESTIONS 1 AND 2: 2
1. LITTLE INTEREST OR PLEASURE IN DOING THINGS: SEVERAL DAYS
2. FEELING DOWN, DEPRESSED OR HOPELESS: SEVERAL DAYS
10. IF YOU CHECKED OFF ANY PROBLEMS, HOW DIFFICULT HAVE THESE PROBLEMS MADE IT FOR YOU TO DO YOUR WORK, TAKE CARE OF THINGS AT HOME, OR GET ALONG WITH OTHER PEOPLE: SOMEWHAT DIFFICULT

## 2025-02-19 NOTE — PROGRESS NOTES
Subjective   Patient ID: Dejah Ambrose is a 59 y.o. female who presents for Annual Exam (Annual CPE and labs).    Pt is here for annual wellness.  Reports overall health is good    Do you take any herbs or supplements that were not prescribed by a doctor? no  Are you taking calcium supplements? yes  Are you taking aspirin daily? no  Colonoscopy: uptodate  Fasting blood work: ordered today  Last eye exam: oct 2024  Last dental Exam: feb 2025  Exercise:walk, pilates 3days a week  Mood:pleasant  Sleep: depends on the night 6-7hours  Diet:  good  Occupation:  watch tv- shows, ghosts  Do you have pain that bothers you in your daily life? no    1. Patient Counseling:  --Nutrition: Stressed importance of moderation in sodium/caffeine intake, saturated fat and cholesterol, caloric balance, sufficient intake of fresh fruits, vegetables, fiber, calcium, iron, and 1 mg of folate supplement per day (for females capable of pregnancy).  --Discussed the issue of estrogen replacement, calcium supplement, and the daily use of baby aspirin as appropriate per pt.  --Exercise: Stressed the importance of regular exercise.   --Substance Abuse: Discussed cessation/primary prevention of tobacco, alcohol, or other drug use; driving or other dangerous activities under the influence; availability of treatment for abuse.    --Sexuality: Discussed sexually transmitted diseases, partner selection, use of condoms, avoidance of unintended pregnancy  and contraceptive alternatives.   --Injury prevention: Discussed safety belts, safety helmets, smoke detector, smoking near bedding or upholstery.   --Dental health: Discussed importance of regular tooth brushing, flossing, and dental visits.  --Immunizations reviewed.  --Discussed benefits of colon cancer screening.  --After hours service discussed with patient  2 Discussed the patient's BMI.  The BMI is in the acceptable range.  3 Follow up as needed for acute illness           Review of  Systems   Constitutional:  Negative for fatigue, fever and unexpected weight change.   HENT:  Negative for congestion, ear discharge, ear pain, nosebleeds, postnasal drip, rhinorrhea, sinus pressure, sinus pain, sneezing, sore throat and trouble swallowing.    Respiratory:  Negative for cough, shortness of breath and wheezing.    Cardiovascular:  Positive for palpitations. Negative for chest pain and leg swelling.        Sees cardiology, has echo next week     Gastrointestinal:  Negative for abdominal pain, constipation and nausea.   Genitourinary:  Negative for dysuria, hematuria, menstrual problem, pelvic pain, vaginal bleeding and vaginal pain.        [facility] for obgyn     Musculoskeletal:  Negative for arthralgias, back pain, gait problem and joint swelling.   Skin:  Negative for rash and wound.   Neurological:  Negative for seizures, facial asymmetry, weakness and light-headedness.   Psychiatric/Behavioral: Negative.         Objective   There were no vitals taken for this visit.    Physical Exam  Constitutional:       General: She is not in acute distress.     Appearance: Normal appearance. She is normal weight. She is not ill-appearing.   HENT:      Head: Normocephalic and atraumatic.      Right Ear: Tympanic membrane and external ear normal.      Left Ear: Tympanic membrane and external ear normal.      Nose: Nose normal.      Mouth/Throat:      Mouth: Mucous membranes are moist.      Pharynx: Oropharynx is clear. Uvula midline.   Eyes:      General: Lids are normal.      Extraocular Movements: Extraocular movements intact.      Pupils: Pupils are equal, round, and reactive to light.   Neck:      Thyroid: No thyromegaly or thyroid tenderness.   Cardiovascular:      Rate and Rhythm: Normal rate and regular rhythm.      Heart sounds: Normal heart sounds.   Pulmonary:      Effort: Pulmonary effort is normal.      Breath sounds: Normal breath sounds.   Abdominal:      General: Bowel sounds are normal.       Palpations: Abdomen is soft.      Tenderness: There is no abdominal tenderness. There is no guarding.   Musculoskeletal:         General: No swelling. Normal range of motion.      Cervical back: Normal range of motion.      Right lower leg: No edema.      Left lower leg: No edema.   Lymphadenopathy:      Head:      Right side of head: No submental, submandibular or tonsillar adenopathy.      Left side of head: No submental, submandibular or tonsillar adenopathy.      Cervical: No cervical adenopathy.   Skin:     General: Skin is warm and dry.      Capillary Refill: Capillary refill takes less than 2 seconds.      Coloration: Skin is not jaundiced.      Findings: No lesion or rash.   Neurological:      General: No focal deficit present.      Mental Status: She is alert and oriented to person, place, and time.   Psychiatric:         Mood and Affect: Mood normal.         Behavior: Behavior normal.         Thought Content: Thought content normal.         Judgment: Judgment normal.         Assessment/Plan   Dejah was seen today for annual exam.  Diagnoses and all orders for this visit:  Wellness examination  Screening for deficiency anemia  -     CBC; Future  -     CBC  Diabetes mellitus screening  -     Comprehensive Metabolic Panel; Future  -     Comprehensive Metabolic Panel  Screening for thyroid disorder  -     TSH with reflex to Free T4 if abnormal; Future  -     TSH with reflex to Free T4 if abnormal  Lipid screening  -     Lipid Panel; Future  -     Lipid Panel  Vitamin D deficiency  -     Vitamin D 25-Hydroxy,Total (for eval of Vitamin D levels); Future  -     Vitamin D 25-Hydroxy,Total (for eval of Vitamin D levels)  Screening for hypercholesterolemia  -     Lipid Panel; Future  -     Lipid Panel

## 2025-02-20 LAB
25(OH)D3+25(OH)D2 SERPL-MCNC: 55 NG/ML (ref 30–100)
ALBUMIN SERPL-MCNC: 4.7 G/DL (ref 3.6–5.1)
ALP SERPL-CCNC: 75 U/L (ref 37–153)
ALT SERPL-CCNC: 21 U/L (ref 6–29)
ANION GAP SERPL CALCULATED.4IONS-SCNC: 8 MMOL/L (CALC) (ref 7–17)
AST SERPL-CCNC: 24 U/L (ref 10–35)
BILIRUB SERPL-MCNC: 0.7 MG/DL (ref 0.2–1.2)
BUN SERPL-MCNC: 19 MG/DL (ref 7–25)
CALCIUM SERPL-MCNC: 9.3 MG/DL (ref 8.6–10.4)
CHLORIDE SERPL-SCNC: 102 MMOL/L (ref 98–110)
CHOLEST SERPL-MCNC: 199 MG/DL
CHOLEST/HDLC SERPL: 2.2 (CALC)
CO2 SERPL-SCNC: 28 MMOL/L (ref 20–32)
CREAT SERPL-MCNC: 0.76 MG/DL (ref 0.5–1.05)
EGFRCR SERPLBLD CKD-EPI 2021: 89 ML/MIN/1.73M2
ERYTHROCYTE [DISTWIDTH] IN BLOOD BY AUTOMATED COUNT: 12.1 % (ref 11–15)
GLUCOSE SERPL-MCNC: 88 MG/DL (ref 65–99)
HCT VFR BLD AUTO: 41.5 % (ref 35–45)
HDLC SERPL-MCNC: 89 MG/DL
HGB BLD-MCNC: 13.7 G/DL (ref 11.7–15.5)
LDLC SERPL CALC-MCNC: 97 MG/DL (CALC)
MCH RBC QN AUTO: 31.9 PG (ref 27–33)
MCHC RBC AUTO-ENTMCNC: 33 G/DL (ref 32–36)
MCV RBC AUTO: 96.5 FL (ref 80–100)
NONHDLC SERPL-MCNC: 110 MG/DL (CALC)
PLATELET # BLD AUTO: 219 THOUSAND/UL (ref 140–400)
PMV BLD REES-ECKER: 11.6 FL (ref 7.5–12.5)
POTASSIUM SERPL-SCNC: 4.4 MMOL/L (ref 3.5–5.3)
PROT SERPL-MCNC: 6.9 G/DL (ref 6.1–8.1)
RBC # BLD AUTO: 4.3 MILLION/UL (ref 3.8–5.1)
SODIUM SERPL-SCNC: 138 MMOL/L (ref 135–146)
TRIGL SERPL-MCNC: 52 MG/DL
TSH SERPL-ACNC: 1.9 MIU/L (ref 0.4–4.5)
WBC # BLD AUTO: 3.7 THOUSAND/UL (ref 3.8–10.8)

## 2025-02-25 ENCOUNTER — HOSPITAL ENCOUNTER (OUTPATIENT)
Dept: CARDIOLOGY | Facility: HOSPITAL | Age: 60
Discharge: HOME | End: 2025-02-25
Payer: COMMERCIAL

## 2025-02-25 DIAGNOSIS — I34.1 MVP (MITRAL VALVE PROLAPSE): ICD-10-CM

## 2025-02-25 DIAGNOSIS — I49.3 PVC'S (PREMATURE VENTRICULAR CONTRACTIONS): ICD-10-CM

## 2025-02-25 PROCEDURE — 93306 TTE W/DOPPLER COMPLETE: CPT | Performed by: INTERNAL MEDICINE

## 2025-02-25 PROCEDURE — 93306 TTE W/DOPPLER COMPLETE: CPT

## 2025-02-26 ENCOUNTER — TELEPHONE (OUTPATIENT)
Dept: PRIMARY CARE | Facility: CLINIC | Age: 60
End: 2025-02-26
Payer: COMMERCIAL

## 2025-02-27 LAB
AORTIC VALVE MEAN GRADIENT: 3 MMHG
AORTIC VALVE PEAK VELOCITY: 1.18 M/S
AV PEAK GRADIENT: 6 MMHG
AVA (PEAK VEL): 2.42 CM2
AVA (VTI): 2.66 CM2
EJECTION FRACTION APICAL 4 CHAMBER: 47.7
EJECTION FRACTION: 50 %
LEFT VENTRICLE INTERNAL DIMENSION DIASTOLE: 4.7 CM (ref 3.5–6)
LEFT VENTRICULAR OUTFLOW TRACT DIAMETER: 2 CM
LV EJECTION FRACTION BIPLANE: 49 %
MITRAL VALVE E/A RATIO: 0.77
RIGHT VENTRICLE FREE WALL PEAK S': 10.2 CM/S
TRICUSPID ANNULAR PLANE SYSTOLIC EXCURSION: 2.1 CM

## 2025-03-05 ENCOUNTER — APPOINTMENT (OUTPATIENT)
Dept: INTEGRATIVE MEDICINE | Facility: CLINIC | Age: 60
End: 2025-03-05
Payer: COMMERCIAL

## 2025-03-20 ENCOUNTER — APPOINTMENT (OUTPATIENT)
Facility: CLINIC | Age: 60
End: 2025-03-20
Payer: COMMERCIAL

## 2025-04-01 ENCOUNTER — OFFICE VISIT (OUTPATIENT)
Facility: CLINIC | Age: 60
End: 2025-04-01
Payer: COMMERCIAL

## 2025-04-01 VITALS
DIASTOLIC BLOOD PRESSURE: 70 MMHG | BODY MASS INDEX: 20.01 KG/M2 | SYSTOLIC BLOOD PRESSURE: 134 MMHG | WEIGHT: 124 LBS | HEART RATE: 60 BPM

## 2025-04-01 DIAGNOSIS — I34.1 MVP (MITRAL VALVE PROLAPSE): Primary | ICD-10-CM

## 2025-04-01 DIAGNOSIS — Z82.49 FAMILY HISTORY OF ISCHEMIC HEART DISEASE: ICD-10-CM

## 2025-04-01 DIAGNOSIS — I49.3 PVC'S (PREMATURE VENTRICULAR CONTRACTIONS): ICD-10-CM

## 2025-04-01 PROCEDURE — 1036F TOBACCO NON-USER: CPT | Performed by: INTERNAL MEDICINE

## 2025-04-01 PROCEDURE — 99213 OFFICE O/P EST LOW 20 MIN: CPT | Performed by: INTERNAL MEDICINE

## 2025-04-01 ASSESSMENT — ENCOUNTER SYMPTOMS
NUMBNESS: 0
PARESTHESIAS: 0
LOSS OF SENSATION IN FEET: 0
COUGH: 0
OCCASIONAL FEELINGS OF UNSTEADINESS: 0
DEPRESSION: 0
SHORTNESS OF BREATH: 0
DYSURIA: 0
HEMATURIA: 0
BLURRED VISION: 0
DYSPNEA ON EXERTION: 0
ABDOMINAL PAIN: 0
PALPITATIONS: 1

## 2025-04-01 ASSESSMENT — PATIENT HEALTH QUESTIONNAIRE - PHQ9
1. LITTLE INTEREST OR PLEASURE IN DOING THINGS: NOT AT ALL
SUM OF ALL RESPONSES TO PHQ9 QUESTIONS 1 AND 2: 0
2. FEELING DOWN, DEPRESSED OR HOPELESS: NOT AT ALL

## 2025-04-01 ASSESSMENT — LIFESTYLE VARIABLES: TOTAL SCORE: 0

## 2025-04-01 ASSESSMENT — PAIN SCALES - GENERAL: PAINLEVEL_OUTOF10: 0-NO PAIN

## 2025-04-01 NOTE — ASSESSMENT & PLAN NOTE
Echocardiogram was reviewed by Dr. Beebe who felt there was bileaflet prolapse but with no mitral valve regurgitation.  He also estimated left ventricular ejection fraction at 50%.  No significant cardiac symptoms at this time.  Will plan on rechecking echocardiogram approximately February 2026.

## 2025-04-01 NOTE — ASSESSMENT & PLAN NOTE
Patient does have palpitations on and off but not frequent and not for long periods of time.  Will follow on a clinical basis at this time.

## 2025-04-01 NOTE — PROGRESS NOTES
Subjective   Dejah Ambrose is a 59 y.o. female.    Chief Complaint:  yearly fu/echo results    HPI  Overall patient feels well.  No significant chest pain.  Has had palpitations every few weeks but they do not last for long period time.    Review of Systems   Constitutional: Negative for malaise/fatigue.   HENT:  Negative for congestion.    Eyes:  Negative for blurred vision.   Cardiovascular:  Positive for palpitations. Negative for chest pain and dyspnea on exertion.   Respiratory:  Negative for cough and shortness of breath.    Musculoskeletal:  Negative for joint pain.   Gastrointestinal:  Negative for abdominal pain.   Genitourinary:  Negative for dysuria and hematuria.   Neurological:  Negative for numbness and paresthesias.       Objective   Constitutional:       Appearance: Not in distress.   Eyes:      Conjunctiva/sclera: Conjunctivae normal.   Neck:      Vascular: JVD normal.   Pulmonary:      Breath sounds: Normal breath sounds. No wheezing. No rhonchi. No rales.   Cardiovascular:      Normal rate. Regular rhythm.      Murmurs: There is no murmur.      No gallop.  No click. No rub.   Abdominal:      Palpations: Abdomen is soft.   Neurological:      General: No focal deficit present.      Mental Status: Alert.         Lab Review:   Hospital Outpatient Visit on 02/25/2025   Component Date Value    AV pk mono 02/25/2025 1.18     LVOT diam 02/25/2025 2.00     AV mn grad 02/25/2025 3     MV E/A ratio 02/25/2025 0.77     Tricuspid annular plane * 02/25/2025 2.1     LV Biplane EF 02/25/2025 49     LV EF 02/25/2025 50     RV free wall pk S' 02/25/2025 10.20     LVIDd 02/25/2025 4.70     AV pk grad 02/25/2025 6     Aortic Valve Area by Con* 02/25/2025 2.66     Aortic Valve Area by Con* 02/25/2025 2.42     LV A4C EF 02/25/2025 47.7    Office Visit on 02/19/2025   Component Date Value    WHITE BLOOD CELL COUNT 02/20/2025 3.7 (L)     RED BLOOD CELL COUNT 02/20/2025 4.30     HEMOGLOBIN 02/20/2025 13.7     HEMATOCRIT  02/20/2025 41.5     MCV 02/20/2025 96.5     MCH 02/20/2025 31.9     MCHC 02/20/2025 33.0     RDW 02/20/2025 12.1     PLATELET COUNT 02/20/2025 219     MPV 02/20/2025 11.6     GLUCOSE 02/20/2025 88     UREA NITROGEN (BUN) 02/20/2025 19     CREATININE 02/20/2025 0.76     EGFR 02/20/2025 89     SODIUM 02/20/2025 138     POTASSIUM 02/20/2025 4.4     CHLORIDE 02/20/2025 102     CARBON DIOXIDE 02/20/2025 28     ELECTROLYTE BALANCE 02/20/2025 8     CALCIUM 02/20/2025 9.3     PROTEIN, TOTAL 02/20/2025 6.9     ALBUMIN 02/20/2025 4.7     BILIRUBIN, TOTAL 02/20/2025 0.7     ALKALINE PHOSPHATASE 02/20/2025 75     AST 02/20/2025 24     ALT 02/20/2025 21     TSH W/REFLEX TO FT4 02/20/2025 1.90     CHOLESTEROL, TOTAL 02/20/2025 199     HDL CHOLESTEROL 02/20/2025 89     TRIGLYCERIDES 02/20/2025 52     LDL-CHOLESTEROL 02/20/2025 97     CHOL/HDLC RATIO 02/20/2025 2.2     NON HDL CHOLESTEROL 02/20/2025 110     VITAMIN D,25-OH,TOTAL,IA 02/20/2025 55        Assessment/Plan   The primary encounter diagnosis was MVP (mitral valve prolapse). A diagnosis of PVC's (premature ventricular contractions) was also pertinent to this visit.    MVP (mitral valve prolapse)  Echocardiogram was reviewed by Dr. Beebe who felt there was bileaflet prolapse but with no mitral valve regurgitation.  He also estimated left ventricular ejection fraction at 50%.  No significant cardiac symptoms at this time.  Will plan on rechecking echocardiogram approximately February 2026.    PVC's (premature ventricular contractions)  Patient does have palpitations on and off but not frequent and not for long periods of time.  Will follow on a clinical basis at this time.    Family history of ischemic heart disease  Patient is very concerned about her overall cardiac risk.  She has aunts and uncles with ischemic heart disease.  LDL cholesterol was 97 on her recent lipid panel study.  I will order a CT coronary artery calcium score for better risk stratification and will  review on return.

## 2025-04-01 NOTE — ASSESSMENT & PLAN NOTE
Patient is very concerned about her overall cardiac risk.  She has aunts and uncles with ischemic heart disease.  LDL cholesterol was 97 on her recent lipid panel study.  I will order a CT coronary artery calcium score for better risk stratification and will review on return.

## 2025-06-11 ENCOUNTER — HOSPITAL ENCOUNTER (OUTPATIENT)
Dept: RADIOLOGY | Facility: CLINIC | Age: 60
Discharge: HOME | End: 2025-06-11
Payer: COMMERCIAL

## 2025-06-11 DIAGNOSIS — Z82.49 FAMILY HISTORY OF ISCHEMIC HEART DISEASE: ICD-10-CM

## 2025-06-11 PROCEDURE — 75571 CT HRT W/O DYE W/CA TEST: CPT

## 2025-06-12 DIAGNOSIS — R91.8 LUNG NODULES: Primary | ICD-10-CM

## 2025-06-13 ENCOUNTER — TELEPHONE (OUTPATIENT)
Facility: CLINIC | Age: 60
End: 2025-06-13
Payer: COMMERCIAL

## 2025-06-13 NOTE — TELEPHONE ENCOUNTER
Called pt to make sure she was aware the referral was put in for pulmonologist per Dr Willams, provided the phone number to her from my chart message from peggy nodule coordinator, pt voice understanding

## 2025-06-25 ENCOUNTER — OFFICE VISIT (OUTPATIENT)
Facility: CLINIC | Age: 60
End: 2025-06-25
Payer: COMMERCIAL

## 2025-06-25 VITALS
OXYGEN SATURATION: 100 % | DIASTOLIC BLOOD PRESSURE: 70 MMHG | BODY MASS INDEX: 19.93 KG/M2 | WEIGHT: 123.5 LBS | HEART RATE: 60 BPM | SYSTOLIC BLOOD PRESSURE: 110 MMHG

## 2025-06-25 DIAGNOSIS — I49.3 PVC'S (PREMATURE VENTRICULAR CONTRACTIONS): ICD-10-CM

## 2025-06-25 DIAGNOSIS — Z82.49 FAMILY HISTORY OF ISCHEMIC HEART DISEASE: Primary | ICD-10-CM

## 2025-06-25 DIAGNOSIS — I34.1 MVP (MITRAL VALVE PROLAPSE): ICD-10-CM

## 2025-06-25 PROCEDURE — 1036F TOBACCO NON-USER: CPT | Performed by: INTERNAL MEDICINE

## 2025-06-25 PROCEDURE — 99214 OFFICE O/P EST MOD 30 MIN: CPT | Performed by: INTERNAL MEDICINE

## 2025-06-25 PROCEDURE — 99212 OFFICE O/P EST SF 10 MIN: CPT

## 2025-06-25 ASSESSMENT — PAIN SCALES - GENERAL: PAINLEVEL_OUTOF10: 0-NO PAIN

## 2025-06-25 ASSESSMENT — ENCOUNTER SYMPTOMS
BLURRED VISION: 0
PARESTHESIAS: 0
DEPRESSION: 0
DYSURIA: 0
DYSPNEA ON EXERTION: 0
PALPITATIONS: 1
HEMATURIA: 0
OCCASIONAL FEELINGS OF UNSTEADINESS: 0
SHORTNESS OF BREATH: 0
NUMBNESS: 0
COUGH: 0
LOSS OF SENSATION IN FEET: 0
ABDOMINAL PAIN: 0

## 2025-06-25 ASSESSMENT — PATIENT HEALTH QUESTIONNAIRE - PHQ9
SUM OF ALL RESPONSES TO PHQ9 QUESTIONS 1 AND 2: 0
1. LITTLE INTEREST OR PLEASURE IN DOING THINGS: NOT AT ALL
2. FEELING DOWN, DEPRESSED OR HOPELESS: NOT AT ALL

## 2025-06-25 ASSESSMENT — LIFESTYLE VARIABLES: TOTAL SCORE: 0

## 2025-06-25 NOTE — ASSESSMENT & PLAN NOTE
CT coronary artery calcium score this month 0, thus overall cardiovascular risk is decreased considerably.  Will just stress standard risk factor modification and follow clinically.  Lung nodules were noted on CT scan and she is scheduled to see a pulmonologist in the coming weeks.

## 2025-06-25 NOTE — ASSESSMENT & PLAN NOTE
Mitral valve prolapse seen on echocardiogram but with no mitral valve regurgitation.  Ejection fraction at the lower limits of normal at 50%.  Will plan on repeating echocardiogram approximately February 2026.  Need to reassess ejection fraction and consider frequency of PVCs as well.

## 2025-06-25 NOTE — ASSESSMENT & PLAN NOTE
Patient with symptomatic PVCs.  Left ventricular systolic function is normal thus I feel these carry little risk at this point in time.  Her exercise stress test back in September 2022 revealed no evidence of ischemia and she achieved 10 METS of workload.  She would rather not have medications at this point in time.  I will bring her back in 6 months and reassess.  Will also consider patch monitor to assess frequency of PVCs if she continues to have symptoms.

## 2025-06-25 NOTE — PROGRESS NOTES
Subjective   Dejah Ambrose is a 59 y.o. female.    Chief Complaint:  ct results (Wants to discuss results )    HPI  59-year-old female with a history of mitral valve prolapse and symptomatic PVCs returns for follow-up visit.  The patient states she still has the palpitations and PVCs like prior which seem to be associated with increased anxiety and stress.  Frequently when she is laying back to go to sleep at night when she is thinking about all the stressful situations that is when she will feel them.  When she is up and active she feels a much less.  No chest pains or unusual shortness of breath.    Review of Systems   Constitutional: Negative for malaise/fatigue.   HENT:  Negative for congestion.    Eyes:  Negative for blurred vision.   Cardiovascular:  Positive for palpitations. Negative for chest pain and dyspnea on exertion.   Respiratory:  Negative for cough and shortness of breath.    Musculoskeletal:  Negative for joint pain.   Gastrointestinal:  Negative for abdominal pain.   Genitourinary:  Negative for dysuria and hematuria.   Neurological:  Negative for numbness and paresthesias.       Objective   Constitutional:       Appearance: Not in distress.   Eyes:      Conjunctiva/sclera: Conjunctivae normal.   Neck:      Vascular: JVD normal.   Pulmonary:      Breath sounds: Normal breath sounds. No wheezing. No rhonchi. No rales.   Cardiovascular:      Normal rate. Regular rhythm.      Murmurs: There is no murmur.      No gallop.  No click. No rub.   Abdominal:      Palpations: Abdomen is soft.   Neurological:      General: No focal deficit present.      Mental Status: Alert.         Lab Review:   No visits with results within 2 Month(s) from this visit.   Latest known visit with results is:   Hospital Outpatient Visit on 02/25/2025   Component Date Value    AV pk mono 02/25/2025 1.18     LVOT diam 02/25/2025 2.00     AV mn grad 02/25/2025 3     MV E/A ratio 02/25/2025 0.77     Tricuspid annular plane *  02/25/2025 2.1     LV Biplane EF 02/25/2025 49     LV EF 02/25/2025 50     RV free wall pk S' 02/25/2025 10.20     LVIDd 02/25/2025 4.70     AV pk grad 02/25/2025 6     Aortic Valve Area by Con* 02/25/2025 2.66     Aortic Valve Area by Con* 02/25/2025 2.42     LV A4C EF 02/25/2025 47.7        Assessment/Plan   The primary encounter diagnosis was Family history of ischemic heart disease. Diagnoses of MVP (mitral valve prolapse) and PVC's (premature ventricular contractions) were also pertinent to this visit.    PVC's (premature ventricular contractions)  Patient with symptomatic PVCs.  Left ventricular systolic function is normal thus I feel these carry little risk at this point in time.  Her exercise stress test back in September 2022 revealed no evidence of ischemia and she achieved 10 METS of workload.  She would rather not have medications at this point in time.  I will bring her back in 6 months and reassess.  Will also consider patch monitor to assess frequency of PVCs if she continues to have symptoms.    MVP (mitral valve prolapse)  Mitral valve prolapse seen on echocardiogram but with no mitral valve regurgitation.  Ejection fraction at the lower limits of normal at 50%.  Will plan on repeating echocardiogram approximately February 2026.  Need to reassess ejection fraction and consider frequency of PVCs as well.    Family history of ischemic heart disease  CT coronary artery calcium score this month 0, thus overall cardiovascular risk is decreased considerably.  Will just stress standard risk factor modification and follow clinically.  Lung nodules were noted on CT scan and she is scheduled to see a pulmonologist in the coming weeks.

## 2025-07-01 ENCOUNTER — OFFICE VISIT (OUTPATIENT)
Dept: PULMONOLOGY | Facility: CLINIC | Age: 60
End: 2025-07-01
Payer: COMMERCIAL

## 2025-07-01 VITALS
DIASTOLIC BLOOD PRESSURE: 74 MMHG | SYSTOLIC BLOOD PRESSURE: 130 MMHG | BODY MASS INDEX: 20.11 KG/M2 | HEART RATE: 61 BPM | RESPIRATION RATE: 16 BRPM | OXYGEN SATURATION: 97 % | TEMPERATURE: 97.3 F | WEIGHT: 124.6 LBS

## 2025-07-01 DIAGNOSIS — R91.8 LUNG NODULES: ICD-10-CM

## 2025-07-01 PROCEDURE — 1036F TOBACCO NON-USER: CPT | Performed by: INTERNAL MEDICINE

## 2025-07-01 PROCEDURE — 99202 OFFICE O/P NEW SF 15 MIN: CPT | Performed by: INTERNAL MEDICINE

## 2025-07-01 PROCEDURE — 99204 OFFICE O/P NEW MOD 45 MIN: CPT | Performed by: INTERNAL MEDICINE

## 2025-07-01 ASSESSMENT — ENCOUNTER SYMPTOMS
HEADACHES: 0
DIZZINESS: 0
NAUSEA: 0
NUMBNESS: 0
WEAKNESS: 0
APNEA: 0
WHEEZING: 0
EYE REDNESS: 0
SINUS PRESSURE: 0
HEMATURIA: 0
LIGHT-HEADEDNESS: 0
ARTHRALGIAS: 0
SPEECH DIFFICULTY: 0
CHOKING: 0
JOINT SWELLING: 0
COUGH: 0
ABDOMINAL PAIN: 0
SLEEP DISTURBANCE: 0
FEVER: 0
UNEXPECTED WEIGHT CHANGE: 0
TREMORS: 0
BRUISES/BLEEDS EASILY: 0
EYE DISCHARGE: 0
DIFFICULTY URINATING: 0
AGITATION: 0
NERVOUS/ANXIOUS: 0
CONSTIPATION: 0
FREQUENCY: 0
STRIDOR: 0
SHORTNESS OF BREATH: 0
RHINORRHEA: 0
DYSURIA: 0
ADENOPATHY: 0
FATIGUE: 0
SINUS PAIN: 0
FACIAL SWELLING: 0
PALPITATIONS: 0
ABDOMINAL DISTENTION: 0

## 2025-07-01 NOTE — PROGRESS NOTES
@PULMONARY CONSULTATION@         Reason for Consult: pulmonary nodules    ASSESSMENT:  The patient is 59-year-old with mitral valve prolapse with PVCs there is a family history of ischemic heart disease.  She underwent a calcium score and was found to have multiple subcentimeter nodules 3.5 mm or less.  She has no real risk factors and is a never smoker.  There is low likelihood that these findings are of concern, probably representing changes related to a past infectious process.  For completeness as I discussed with her  I am going arrange for a follow-up CT scan in 1 year.     PLAN:   A follow-up CT scan of the chest will be obtained in 1 year      HISTORY OF PRESENT ILLNESS:           The patient is a 59-year-old with a history of mitral valve prolapse with symptomatic premature ventricular contraction.  This could be associated with anxiety and stress.  She does have a family history of ischemic heart disease.  For the latter she underwent a calcium score and was found to have some nodularity and was referred for evaluation.    The coronary artery score from June 11, 2025 revealed the following  1. Coronary artery calcium score of 0*.  2. Multiple noncalcified nodule noted (max 3.5 mm). No further  follow-up is required, however, if the patient has high risk factors  for primary lung malignancy, follow-up noncontrast CT scan chest in  12 months may be obtained    No gross evidence of mediastinal or hilar lymphadenopathy or masses  is identified. The visualized segments of the lungs are normally  expanded.3.5 mm noncalcified nodule noted at the left upper lobe (image 19 of  74); 2.9 mm noncalcified nodule noted at the left upper lobe (image 22 of  74); 2.5 mm noncalcified nodule noted at the right upper lobe (image 19 of  74);2.2 mm noncalcified nodule noted at the right lower lobe (image 19 of 74)    An echocardiogram from February 5, 2025 revealed the following   1. The left ventricular systolic  function is mildly decreased, with a visually estimated ejection fraction of 50%.   2. There is global hypokinesis of the left ventricle with minor regional variations.   3. There is a bileaflet mitral valve prolapse borderline without mitral regurgitation.   4. No evidence of mitral valve regurgitation.   5. Trace tricuspid regurgitation is visualized.    Currently, the patient has no coughing congestion wheezing shortness of breath PND orthopnea.  She has not had any exposures to chemicals or fumes.  She has had a cat without any other types of animal exposures.  She has not had any exposure to hot tubs saunas etc.  She has not lived anyplace else except in [facility].  She has not been out on a farm and as not been  exposed to farm animals etc.  She is a never smoker.  She did have severe COVID in 2021 and was sick for about a month.  She was not hospitalized.  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  RX Allergies[1]     PAST MEDICAL HISTORY:   history of mitral valve prolapse with symptomatic premature ventricular contraction.    Current Medications[2]     FAMILY HISTORY:   Family history of ischemic heart disease without any pulmonary issues.  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    Review of Systems   Constitutional:  Negative for fatigue, fever and unexpected weight change.   HENT:  Negative for congestion, facial swelling, nosebleeds, postnasal drip, rhinorrhea, sinus pressure and sinus pain.    Eyes:  Negative for discharge, redness and visual disturbance.   Respiratory:  Negative for apnea, cough, choking, shortness of breath, wheezing and stridor.    Cardiovascular:  Negative for chest pain, palpitations and leg swelling.   Gastrointestinal:  Negative for abdominal distention,  abdominal pain, constipation and nausea.   Endocrine: Negative for cold intolerance and heat intolerance.   Genitourinary:  Negative for difficulty urinating, dysuria, frequency and hematuria.   Musculoskeletal:  Negative for arthralgias, gait problem and joint swelling.   Allergic/Immunologic: Negative for environmental allergies, food allergies and immunocompromised state.   Neurological:  Negative for dizziness, tremors, syncope, speech difficulty, weakness, light-headedness, numbness and headaches.   Hematological:  Negative for adenopathy. Does not bruise/bleed easily.   Psychiatric/Behavioral:  Negative for agitation, behavioral problems and sleep disturbance. The patient is not nervous/anxious.         Vitals:    07/01/25 0941   BP: 130/74   Pulse: 61   Resp: 16   Temp: 36.3 °C (97.3 °F)   SpO2: 97%        Physical Exam          [1] No Known Allergies  [2]   Current Outpatient Medications:     biotin 300 mcg tablet, Take 1 tablet (300 mcg) by mouth once daily., Disp: , Rfl:     calcium 500 mg calcium (1,250 mg) tablet, Take 1 tablet by mouth once daily., Disp: , Rfl:     multivitamin tablet, Take 1 tablet by mouth once daily., Disp: , Rfl:

## 2025-07-01 NOTE — LETTER
July 1, 2025     Jesse Willams DO  [id number]  [facility]  [place] [id number]    Patient: Dejah Ambrose   YOB: 1965   Date of Visit: 7/1/2025       Dear Dr. Jesse Willams DO:    Thank you for referring Dejah Ambrose to me for evaluation. Below are my notes for this consultation.  If you have questions, please do not hesitate to call me. I look forward to following your patient along with you.       Sincerely,     Kings Gomes MD MPH      CC: No Recipients  ______________________________________________________________________________________    @PULMONARY CONSULTATION@         Reason for Consult: pulmonary nodules    ASSESSMENT:  The patient is 59-year-old with mitral valve prolapse with PVCs there is a family history of ischemic heart disease.  She underwent a calcium score and was found to have multiple subcentimeter nodules 3.5 mm or less.  She has no real risk factors and is a never smoker.  There is low likelihood that these findings are of concern, probably representing changes related to a past infectious process.  For completeness as I discussed with her  I am going arrange for a follow-up CT scan in 1 year.     PLAN:   A follow-up CT scan of the chest will be obtained in 1 year      HISTORY OF PRESENT ILLNESS:           The patient is a 59-year-old with a history of mitral valve prolapse with symptomatic premature ventricular contraction.  This could be associated with anxiety and stress.  She does have a family history of ischemic heart disease.  For the latter she underwent a calcium score and was found to have some nodularity and was referred for evaluation.    The coronary artery score from June 11, 2025 revealed the following  1. Coronary artery calcium score of 0*.  2. Multiple noncalcified nodule noted (max 3.5 mm). No further  follow-up is required, however, if the patient has high risk factors  for primary lung malignancy, follow-up  noncontrast CT scan chest in  12 months may be obtained    No gross evidence of mediastinal or hilar lymphadenopathy or masses  is identified. The visualized segments of the lungs are normally  expanded.3.5 mm noncalcified nodule noted at the left upper lobe (image 19 of  74); 2.9 mm noncalcified nodule noted at the left upper lobe (image 22 of  74); 2.5 mm noncalcified nodule noted at the right upper lobe (image 19 of  74);2.2 mm noncalcified nodule noted at the right lower lobe (image 19 of 74)    An echocardiogram from February 5, 2025 revealed the following   1. The left ventricular systolic function is mildly decreased, with a visually estimated ejection fraction of 50%.   2. There is global hypokinesis of the left ventricle with minor regional variations.   3. There is a bileaflet mitral valve prolapse borderline without mitral regurgitation.   4. No evidence of mitral valve regurgitation.   5. Trace tricuspid regurgitation is visualized.    Currently, the patient has no coughing congestion wheezing shortness of breath PND orthopnea.  She has not had any exposures to chemicals or fumes.  She has had a cat without any other types of animal exposures.  She has not had any exposure to hot tubs saunas etc.  She has not lived anyplace else except in [facility].  She has not been out on a farm and as not been  exposed to farm animals etc.  She is a never smoker.  She did have severe COVID in 2021 and was sick for about a month.  She was not hospitalized.  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  RX Allergies[1]     PAST MEDICAL HISTORY:   history of mitral valve prolapse with symptomatic premature ventricular contraction.    Current Medications[2]     FAMILY HISTORY:   Family history of ischemic heart disease without any pulmonary issues.  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    Review of Systems   Constitutional:  Negative for fatigue, fever and unexpected weight change.   HENT:  Negative for congestion, facial swelling, nosebleeds, postnasal drip, rhinorrhea, sinus pressure and sinus pain.    Eyes:  Negative for discharge, redness and visual disturbance.   Respiratory:  Negative for apnea, cough, choking, shortness of breath, wheezing and stridor.    Cardiovascular:  Negative for chest pain, palpitations and leg swelling.   Gastrointestinal:  Negative for abdominal distention, abdominal pain, constipation and nausea.   Endocrine: Negative for cold intolerance and heat intolerance.   Genitourinary:  Negative for difficulty urinating, dysuria, frequency and hematuria.   Musculoskeletal:  Negative for arthralgias, gait problem and joint swelling.   Allergic/Immunologic: Negative for environmental allergies, food allergies and immunocompromised state.   Neurological:  Negative for dizziness, tremors, syncope, speech difficulty, weakness, light-headedness, numbness and headaches.   Hematological:  Negative for adenopathy. Does not bruise/bleed easily.   Psychiatric/Behavioral:  Negative for agitation, behavioral problems and sleep disturbance. The patient is not nervous/anxious.         Vitals:    07/01/25 0941   BP: 130/74   Pulse: 61   Resp: 16   Temp: 36.3 °C (97.3 °F)   SpO2: 97%        Physical Exam          [1]  No Known Allergies  [2]    Current Outpatient Medications:   •  biotin 300 mcg tablet, Take 1 tablet (300 mcg) by mouth once daily., Disp: , Rfl:   •  calcium 500 mg calcium (1,250 mg) tablet, Take 1 tablet by mouth once daily., Disp: , Rfl:   •  multivitamin tablet, Take 1 tablet by mouth once daily., Disp: , Rfl:        [1]  No Known Allergies  [2]    Current Outpatient Medications:   •  biotin 300 mcg tablet, Take 1 tablet (300 mcg) by mouth once daily., Disp: , Rfl:   •   calcium 500 mg calcium (1,250 mg) tablet, Take 1 tablet by mouth once daily., Disp: , Rfl:   •  multivitamin tablet, Take 1 tablet by mouth once daily., Disp: , Rfl:

## 2025-07-03 PROCEDURE — 88175 CYTOPATH C/V AUTO FLUID REDO: CPT

## 2025-07-07 ENCOUNTER — LAB REQUISITION (OUTPATIENT)
Dept: LAB | Facility: HOSPITAL | Age: 60
End: 2025-07-07
Payer: COMMERCIAL

## 2025-07-07 DIAGNOSIS — Z01.419 ENCOUNTER FOR GYNECOLOGICAL EXAMINATION (GENERAL) (ROUTINE) WITHOUT ABNORMAL FINDINGS: ICD-10-CM

## 2025-07-07 DIAGNOSIS — Z11.51 ENCOUNTER FOR SCREENING FOR HUMAN PAPILLOMAVIRUS (HPV): ICD-10-CM

## 2025-07-07 DIAGNOSIS — Z12.4 ENCOUNTER FOR SCREENING FOR MALIGNANT NEOPLASM OF CERVIX: ICD-10-CM

## 2025-07-08 ENCOUNTER — HOSPITAL ENCOUNTER (OUTPATIENT)
Dept: RADIOLOGY | Facility: HOSPITAL | Age: 60
Discharge: HOME | End: 2025-07-08
Payer: COMMERCIAL

## 2025-07-08 VITALS — WEIGHT: 124 LBS | BODY MASS INDEX: 19.93 KG/M2 | HEIGHT: 66 IN

## 2025-07-08 DIAGNOSIS — Z12.31 ENCOUNTER FOR SCREENING MAMMOGRAM FOR MALIGNANT NEOPLASM OF BREAST: ICD-10-CM

## 2025-07-08 PROCEDURE — 77067 SCR MAMMO BI INCL CAD: CPT | Performed by: RADIOLOGY

## 2025-07-08 PROCEDURE — 77063 BREAST TOMOSYNTHESIS BI: CPT | Performed by: RADIOLOGY

## 2025-07-08 PROCEDURE — 77067 SCR MAMMO BI INCL CAD: CPT

## 2025-08-27 ENCOUNTER — APPOINTMENT (OUTPATIENT)
Dept: PULMONOLOGY | Facility: CLINIC | Age: 60
End: 2025-08-27
Payer: COMMERCIAL

## 2026-02-25 ENCOUNTER — APPOINTMENT (OUTPATIENT)
Dept: PRIMARY CARE | Facility: CLINIC | Age: 61
End: 2026-02-25
Payer: COMMERCIAL